# Patient Record
(demographics unavailable — no encounter records)

---

## 2024-11-14 NOTE — ASSESSMENT
[FreeTextEntry1] :  It was my impression that he done quite well from his nasal surgery and has a significant improvement in his airway and snoring according to his wife.  He still has a slight left septal deflection.  In the interim he had a level 2 sleep study.  This showed an apnea-hypopnea index of 5.7.  He had 22 desaturations for a total of 30 seconds during the night with a lowest saturation of 85%.  I reviewed this with him and this is really mostly an upper airways resistance syndrome.  I would not recommend CPAP and reassured him although he may benefit from an oral appliance and this was discussed  I would like to see the patient back as needed.

## 2024-11-14 NOTE — PHYSICAL EXAM
[FreeTextEntry1] : General: The patient was alert and oriented and in no distress. Voice was clear.  Face: The patient had no facial asymmetry or mass. The skin was unremarkable.  Nose:  The external nose had no significant deformity.  There was no facial tenderness.  On anterior rhinoscopy, the nasal mucosa was clear.  The anterior septum was midline.  There were no visualized polyps purulence  or masses. His nasal airway was markedly improved though there still was a residual partial left septal deflection there was some dry mucus in the nasal cavity

## 2024-11-14 NOTE — HISTORY OF PRESENT ILLNESS
[de-identified] : TALITA RIZZO PhD Was seen in follow-up on November 14.  He had done quite well from his septoplasty and feels his nasal airway was improved.  At the time, the anesthesiologist thought that the patient may have sleep apnea based on his exam.  Dr. Rizzo had a sleep study and comes in for repeat evaluation